# Patient Record
Sex: MALE | Race: WHITE | NOT HISPANIC OR LATINO | Employment: OTHER | URBAN - METROPOLITAN AREA
[De-identification: names, ages, dates, MRNs, and addresses within clinical notes are randomized per-mention and may not be internally consistent; named-entity substitution may affect disease eponyms.]

---

## 2019-02-22 ENCOUNTER — HISTORICAL (OUTPATIENT)
Dept: ADMINISTRATIVE | Facility: HOSPITAL | Age: 84
End: 2019-02-22

## 2019-05-14 ENCOUNTER — HISTORICAL (OUTPATIENT)
Dept: ADMINISTRATIVE | Facility: HOSPITAL | Age: 84
End: 2019-05-14

## 2019-05-14 LAB
ABS NEUT (OLG): 5.16 X10(3)/MCL (ref 2.1–9.2)
APTT PPP: 31.2 SECOND(S) (ref 24.2–33.9)
BASOPHILS # BLD AUTO: 0.1 X10(3)/MCL (ref 0–0.2)
BASOPHILS NFR BLD AUTO: 1 %
BUN SERPL-MCNC: 24 MG/DL (ref 7–18)
CALCIUM SERPL-MCNC: 9 MG/DL (ref 8.5–10.1)
CHLORIDE SERPL-SCNC: 107 MMOL/L (ref 98–107)
CO2 SERPL-SCNC: 29 MMOL/L (ref 21–32)
CREAT SERPL-MCNC: 1.2 MG/DL (ref 0.7–1.3)
CREAT/UREA NIT SERPL: 20
EOSINOPHIL # BLD AUTO: 0.2 X10(3)/MCL (ref 0–0.9)
EOSINOPHIL NFR BLD AUTO: 2 %
ERYTHROCYTE [DISTWIDTH] IN BLOOD BY AUTOMATED COUNT: 13.8 % (ref 11.5–17)
GLUCOSE SERPL-MCNC: 101 MG/DL (ref 74–106)
HCT VFR BLD AUTO: 45.1 % (ref 42–52)
HGB BLD-MCNC: 14.6 GM/DL (ref 14–18)
INR PPP: 1.2 (ref 0–1.3)
LYMPHOCYTES # BLD AUTO: 3 X10(3)/MCL (ref 0.6–4.6)
LYMPHOCYTES NFR BLD AUTO: 32 %
MCH RBC QN AUTO: 29.4 PG (ref 27–31)
MCHC RBC AUTO-ENTMCNC: 32.4 GM/DL (ref 33–36)
MCV RBC AUTO: 90.9 FL (ref 80–94)
MONOCYTES # BLD AUTO: 1 X10(3)/MCL (ref 0.1–1.3)
MONOCYTES NFR BLD AUTO: 10 %
NEUTROPHILS # BLD AUTO: 5.16 X10(3)/MCL (ref 2.1–9.2)
NEUTROPHILS NFR BLD AUTO: 55 %
PLATELET # BLD AUTO: 239 X10(3)/MCL (ref 130–400)
PMV BLD AUTO: 9.3 FL (ref 9.4–12.4)
POTASSIUM SERPL-SCNC: 4.9 MMOL/L (ref 3.5–5.1)
PROTHROMBIN TIME: 15.1 SECOND(S) (ref 12–14)
RBC # BLD AUTO: 4.96 X10(6)/MCL (ref 4.7–6.1)
SODIUM SERPL-SCNC: 141 MMOL/L (ref 136–145)
WBC # SPEC AUTO: 9.4 X10(3)/MCL (ref 4.5–11.5)

## 2019-06-24 ENCOUNTER — HISTORICAL (OUTPATIENT)
Dept: CARDIOLOGY | Facility: HOSPITAL | Age: 84
End: 2019-06-24

## 2022-07-19 ENCOUNTER — ANESTHESIA (OUTPATIENT)
Dept: CARDIOLOGY | Facility: HOSPITAL | Age: 87
End: 2022-07-19
Payer: MEDICARE

## 2022-07-19 ENCOUNTER — HOSPITAL ENCOUNTER (OUTPATIENT)
Dept: CARDIOLOGY | Facility: HOSPITAL | Age: 87
Discharge: HOME | End: 2022-07-19
Attending: INTERNAL MEDICINE
Payer: MEDICARE

## 2022-07-19 ENCOUNTER — ANESTHESIA EVENT (OUTPATIENT)
Dept: CARDIOLOGY | Facility: HOSPITAL | Age: 87
End: 2022-07-19
Payer: MEDICARE

## 2022-07-19 VITALS
DIASTOLIC BLOOD PRESSURE: 68 MMHG | WEIGHT: 187.38 LBS | HEIGHT: 71 IN | TEMPERATURE: 98 F | SYSTOLIC BLOOD PRESSURE: 127 MMHG | HEART RATE: 53 BPM | RESPIRATION RATE: 21 BRPM | OXYGEN SATURATION: 97 % | BODY MASS INDEX: 26.23 KG/M2

## 2022-07-19 DIAGNOSIS — I25.10 CAD (CORONARY ARTERY DISEASE): ICD-10-CM

## 2022-07-19 DIAGNOSIS — I48.3 TYPICAL ATRIAL FLUTTER: Primary | ICD-10-CM

## 2022-07-19 DIAGNOSIS — Z92.89 HISTORY OF CARDIOVERSION: ICD-10-CM

## 2022-07-19 PROCEDURE — 93005 ELECTROCARDIOGRAM TRACING: CPT

## 2022-07-19 PROCEDURE — 93010 ELECTROCARDIOGRAM REPORT: CPT | Mod: 76,,, | Performed by: INTERNAL MEDICINE

## 2022-07-19 PROCEDURE — 25000003 PHARM REV CODE 250

## 2022-07-19 PROCEDURE — 93010 ELECTROCARDIOGRAM REPORT: CPT | Mod: ,,, | Performed by: INTERNAL MEDICINE

## 2022-07-19 PROCEDURE — 63600175 PHARM REV CODE 636 W HCPCS

## 2022-07-19 PROCEDURE — 37000008 HC ANESTHESIA 1ST 15 MINUTES

## 2022-07-19 PROCEDURE — 92960 CARDIOVERSION ELECTRIC EXT: CPT

## 2022-07-19 PROCEDURE — 93010 EKG 12-LEAD: ICD-10-PCS | Mod: 76,,, | Performed by: INTERNAL MEDICINE

## 2022-07-19 PROCEDURE — 93005 ELECTROCARDIOGRAM TRACING: CPT | Mod: 59

## 2022-07-19 RX ORDER — IBUPROFEN 100 MG/5ML
1000 SUSPENSION, ORAL (FINAL DOSE FORM) ORAL DAILY
COMMUNITY

## 2022-07-19 RX ORDER — PROPOFOL 10 MG/ML
VIAL (ML) INTRAVENOUS
Status: DISCONTINUED | OUTPATIENT
Start: 2022-07-19 | End: 2022-07-19

## 2022-07-19 RX ORDER — SODIUM CHLORIDE, SODIUM LACTATE, POTASSIUM CHLORIDE, CALCIUM CHLORIDE 600; 310; 30; 20 MG/100ML; MG/100ML; MG/100ML; MG/100ML
INJECTION, SOLUTION INTRAVENOUS CONTINUOUS
Status: CANCELLED | OUTPATIENT
Start: 2022-07-19

## 2022-07-19 RX ORDER — DILTIAZEM HYDROCHLORIDE 240 MG/1
240 CAPSULE, COATED, EXTENDED RELEASE ORAL DAILY
COMMUNITY

## 2022-07-19 RX ORDER — PHENYLEPHRINE HYDROCHLORIDE 10 MG/ML
INJECTION INTRAVENOUS
Status: DISPENSED
Start: 2022-07-19 | End: 2022-07-19

## 2022-07-19 RX ORDER — HYDRALAZINE HYDROCHLORIDE 50 MG/1
50 TABLET, FILM COATED ORAL 2 TIMES DAILY
COMMUNITY

## 2022-07-19 RX ORDER — CALCIUM CARB/VITAMIN D3/VIT K1 500-500-40
400 TABLET,CHEWABLE ORAL DAILY
COMMUNITY

## 2022-07-19 RX ORDER — OLMESARTAN MEDOXOMIL 40 MG/1
40 TABLET ORAL DAILY
COMMUNITY

## 2022-07-19 RX ORDER — SACUBITRIL AND VALSARTAN 24; 26 MG/1; MG/1
1 TABLET, FILM COATED ORAL 2 TIMES DAILY
COMMUNITY

## 2022-07-19 RX ORDER — METOPROLOL SUCCINATE 100 MG/1
100 TABLET, EXTENDED RELEASE ORAL DAILY
COMMUNITY

## 2022-07-19 RX ORDER — LIDOCAINE HYDROCHLORIDE 20 MG/ML
INJECTION, SOLUTION EPIDURAL; INFILTRATION; INTRACAUDAL; PERINEURAL
Status: DISCONTINUED | OUTPATIENT
Start: 2022-07-19 | End: 2022-07-19

## 2022-07-19 RX ORDER — BUPROPION HYDROCHLORIDE 300 MG/1
300 TABLET ORAL DAILY
COMMUNITY

## 2022-07-19 RX ORDER — MULTIVITAMIN
1 TABLET ORAL DAILY
COMMUNITY

## 2022-07-19 RX ORDER — METOPROLOL SUCCINATE 50 MG/1
50 TABLET, EXTENDED RELEASE ORAL DAILY
COMMUNITY

## 2022-07-19 RX ORDER — AMIODARONE HYDROCHLORIDE 200 MG/1
200 TABLET ORAL DAILY
COMMUNITY

## 2022-07-19 RX ORDER — LEVOTHYROXINE SODIUM 200 UG/1
200 TABLET ORAL DAILY
COMMUNITY

## 2022-07-19 RX ORDER — ONDANSETRON 2 MG/ML
4 INJECTION INTRAMUSCULAR; INTRAVENOUS DAILY PRN
Status: CANCELLED | OUTPATIENT
Start: 2022-07-19

## 2022-07-19 RX ORDER — DIAPER,BRIEF,ADULT, DISPOSABLE
500 EACH MISCELLANEOUS DAILY
COMMUNITY

## 2022-07-19 RX ORDER — SODIUM CHLORIDE, SODIUM GLUCONATE, SODIUM ACETATE, POTASSIUM CHLORIDE AND MAGNESIUM CHLORIDE 30; 37; 368; 526; 502 MG/100ML; MG/100ML; MG/100ML; MG/100ML; MG/100ML
1000 INJECTION, SOLUTION INTRAVENOUS CONTINUOUS
Status: CANCELLED | OUTPATIENT
Start: 2022-07-19 | End: 2022-08-18

## 2022-07-19 RX ADMIN — LIDOCAINE HYDROCHLORIDE 4 ML: 20 INJECTION, SOLUTION EPIDURAL; INFILTRATION; INTRACAUDAL; PERINEURAL at 07:07

## 2022-07-19 RX ADMIN — PROPOFOL 60 MG: 10 INJECTION, EMULSION INTRAVENOUS at 07:07

## 2022-07-19 RX ADMIN — SODIUM CHLORIDE, SODIUM GLUCONATE, SODIUM ACETATE, POTASSIUM CHLORIDE AND MAGNESIUM CHLORIDE: 526; 502; 368; 37; 30 INJECTION, SOLUTION INTRAVENOUS at 07:07

## 2022-07-19 NOTE — ANESTHESIA PREPROCEDURE EVALUATION
"                                                                                                             07/19/2022  Wilfredo Godfrey is a 89 y.o., male   Pre-operative evaluation for * No procedures listed *    BP (!) 163/88   Pulse 78   Temp 36.6 °C (97.8 °F) (Oral)   Ht 5' 11" (1.803 m)   Wt 85 kg (187 lb 6.3 oz)   SpO2 97%   BMI 26.14 kg/m²     There is no problem list on file for this patient.      Review of patient's allergies indicates:  No Known Allergies    Current Outpatient Medications   Medication Instructions    amiodarone (PACERONE) 200 mg, Oral, Daily    apixaban (ELIQUIS) 5 mg, Oral, 2 times daily    ascorbic acid (vitamin C) (VITAMIN C) 1,000 mg, Oral, Daily    buPROPion (WELLBUTRIN XL) 300 mg, Oral, Daily    calcium carbonate/vitamin D3 (CALCIUM 600 + D,3, ORAL) 1 tablet, Oral, Daily    cholecalciferol (vitamin D3) (VITAMIN D3) 400 Units, Oral, Daily    diltiaZEM (CARDIZEM CD) 240 mg, Oral, Daily    hydrALAZINE (APRESOLINE) 50 mg, Oral, 2 times daily    levothyroxine (SYNTHROID) 200 mcg, Oral, Daily    lysine (L-LYSINE) 500 mg, Oral, Daily    metoprolol succinate (TOPROL-XL) 100 mg, Oral, Daily, Take with 50 mg tablet to equal 150 mg    metoprolol succinate (TOPROL-XL) 50 mg, Oral, Daily, Take with 100 mg tablet to equal 150 mg    multivitamin (ONE DAILY MULTIVITAMIN) per tablet 1 tablet, Oral, Daily    olmesartan (BENICAR) 40 mg, Oral, Daily    sacubitriL-valsartan (ENTRESTO) 24-26 mg per tablet 1 tablet, Oral, 2 times daily    vit A/vit C/vit E/zinc/copper (PRESERVISION AREDS ORAL) 1 tablet, Oral, Daily    zinc 50 mg Tab 1 tablet, Oral, Daily       Past Surgical History:   Procedure Laterality Date    CARDIAC ELECTROPHYSIOLOGY STUDY AND ABLATION  2019    Atrial Fibrillation; Dr Mendes    CARDIOVERSION      Dr. Raman 1st time    CARDIOVERSION      Dr. Raman; 2nd time    CARPAL TUNNEL RELEASE      CATARACT EXTRACTION      KNEE SURGERY         Social History "     Socioeconomic History    Marital status:    Tobacco Use    Smoking status: Never Smoker    Smokeless tobacco: Never Used   Substance and Sexual Activity    Alcohol use: Yes     Comment: glass of wine occasionally with dinner    Drug use: Never       Lab Results   Component Value Date    WBC 9.4 05/14/2019    HGB 14.6 05/14/2019    HCT 45.1 05/14/2019    MCV 90.9 05/14/2019     05/14/2019          BMP  Lab Results   Component Value Date     05/14/2019    K 4.9 05/14/2019    CHLORIDE 107 05/14/2019    CO2 29.0 05/14/2019    GLUCOSE 101 05/14/2019    BUN 24.0 (H) 05/14/2019    CREATININE 1.20 05/14/2019    CALCIUM 9.0 05/14/2019    EGFRNONAA >60 05/14/2019        INR  No results for input(s): PT, INR, PROTIME, APTT in the last 72 hours.    EF 45%    Diagnostic Studies:      EKG:  No results found for this or any previous visit.    Pre-op Assessment          Review of Systems  Anesthesia Hx:  No problems with previous Anesthesia  Denies Family Hx of Anesthesia complications.    Cardiovascular:  Cardiovascular Normal  No Cardiac Complaints   Pulmonary:  Pulmonary Normal No Pulmonary Complaints   Hepatic/GI:   No Current GERD Sx       Physical Exam  General: Alert and Oriented    Airway:  Mallampati: II   Mouth Opening: Normal  TM Distance: Normal  Tongue: Normal  Neck ROM: Normal ROM    Dental:  Intact    Chest/Lungs:  Clear to auscultation, Normal Respiratory Rate    Heart:  Rate: Normal  Rhythm: Irregularly Irregular        Anesthesia Plan  Type of Anesthesia, risks & benefits discussed:    Anesthesia Type: Gen Natural Airway  Intra-op Monitoring Plan: Standard ASA Monitors  Post Op Pain Control Plan: multimodal analgesia  Induction:  IV  Airway Plan: Direct  Informed Consent: Informed consent signed with the Patient and all parties understand the risks and agree with anesthesia plan.  All questions answered. Patient consented to blood products? No  ASA Score: 3  Day of Surgery Review of  History & Physical: H&P Update referred to the surgeon/provider.  Anesthesia Plan Notes: Nasal cannula vs facemask supplemental oxygenation   For patients with HARSHAL/obesity, may consider SuperNoval Nasal CPAP      Ready For Surgery From Anesthesia Perspective.     .

## 2022-07-19 NOTE — DISCHARGE SUMMARY
[facility] - Cardiology Diagnostics  Discharge Note  Short Stay    * No procedures listed *    OUTCOME: Patient tolerated treatment/procedure well without complication and is now ready for discharge.    DISPOSITION: Home or Self Care    FINAL DIAGNOSIS:  <principal problem not specified>    FOLLOWUP: In clinic    DISCHARGE INSTRUCTIONS:    Discharge Procedure Orders   Diet general        TIME SPENT ON DISCHARGE: 10 minutes

## 2022-07-19 NOTE — DISCHARGE INSTRUCTIONS
Go to ER if unusual symptoms occur. Sites where shock delivered may be tender or sore; may use aloe vera lotion or hydrocortisone cream to help. Monitor for symptoms that you had before cardioversion & if they occur, call 's office. No driving for 24 hours. Drink plenty of fluids.

## 2022-07-19 NOTE — TRANSFER OF CARE
"Anesthesia Transfer of Care Note    Patient: Wilfredo Godfrey    Procedure(s) Performed: * No procedures listed *    Patient location: PACU    Anesthesia Type: general    Transport from OR: Transported from OR on 2-3 L/min O2 by NC with adequate spontaneous ventilation    Post pain: adequate analgesia    Post assessment: no apparent anesthetic complications    Post vital signs: stable    Level of consciousness: responds to stimulation    Nausea/Vomiting: no nausea/vomiting    Complications: none    Transfer of care protocol was followedComments: Detailed report with handoff to licensed provider complete      Last vitals:   Visit Vitals  BP (!) 163/88   Pulse 78   Temp 36.6 °C (97.8 °F) (Oral)   Ht 5' 11" (1.803 m)   Wt 85 kg (187 lb 6.3 oz)   SpO2 97%   BMI 26.14 kg/m²     "

## 2022-07-20 NOTE — ANESTHESIA POSTPROCEDURE EVALUATION
Anesthesia Post Evaluation    Patient: Wilfredo Godfrey    Procedure(s) Performed: * No procedures listed *    Final Anesthesia Type: general      Patient location during evaluation: PACU  Patient participation: Yes- Able to Participate  Level of consciousness: awake  Post-procedure vital signs: reviewed and stable  Pain management: adequate  Airway patency: patent    PONV status at discharge: vomiting (controlled) and nausea (inadequately controlled)  Anesthetic complications: no      Cardiovascular status: hemodynamically stable  Respiratory status: spontaneous ventilation and unassisted  Hydration status: euvolemic  Follow-up not needed.  Comments:              Vitals Value Taken Time   /68 07/19/22 0908   Temp 36.6 07/20/22 0639   Pulse 53 07/19/22 0914   Resp 21 07/19/22 0914   SpO2 97 % 07/19/22 0913   Vitals shown include unvalidated device data.      Event Time   Out of Recovery 08:25:00         Pain/Leon Score: Leon Score: 10 (7/19/2022  8:29 AM)